# Patient Record
Sex: FEMALE | Race: WHITE | NOT HISPANIC OR LATINO
[De-identification: names, ages, dates, MRNs, and addresses within clinical notes are randomized per-mention and may not be internally consistent; named-entity substitution may affect disease eponyms.]

---

## 2018-08-06 PROBLEM — Z00.00 ENCOUNTER FOR PREVENTIVE HEALTH EXAMINATION: Status: ACTIVE | Noted: 2018-08-06

## 2018-08-07 ENCOUNTER — APPOINTMENT (OUTPATIENT)
Dept: PLASTIC SURGERY | Facility: CLINIC | Age: 28
End: 2018-08-07
Payer: SELF-PAY

## 2019-12-10 ENCOUNTER — APPOINTMENT (OUTPATIENT)
Dept: PLASTIC SURGERY | Facility: CLINIC | Age: 29
End: 2019-12-10
Payer: SELF-PAY

## 2019-12-10 PROCEDURE — 15789 CHEMICAL PEEL FACIAL DERMAL: CPT

## 2020-01-06 ENCOUNTER — APPOINTMENT (OUTPATIENT)
Dept: PLASTIC SURGERY | Facility: CLINIC | Age: 30
End: 2020-01-06
Payer: SELF-PAY

## 2020-01-06 PROCEDURE — 99212 OFFICE O/P EST SF 10 MIN: CPT | Mod: NC

## 2020-04-26 ENCOUNTER — MESSAGE (OUTPATIENT)
Age: 30
End: 2020-04-26

## 2020-05-02 LAB
SARS-COV-2 IGG SERPL IA-ACNC: 0 INDEX
SARS-COV-2 IGG SERPL QL IA: NEGATIVE

## 2020-05-04 ENCOUNTER — APPOINTMENT (OUTPATIENT)
Dept: DISASTER EMERGENCY | Facility: HOSPITAL | Age: 30
End: 2020-05-04

## 2020-08-01 ENCOUNTER — TRANSCRIPTION ENCOUNTER (OUTPATIENT)
Age: 30
End: 2020-08-01

## 2020-09-02 ENCOUNTER — TRANSCRIPTION ENCOUNTER (OUTPATIENT)
Age: 30
End: 2020-09-02

## 2021-03-23 ENCOUNTER — APPOINTMENT (OUTPATIENT)
Dept: PLASTIC SURGERY | Facility: CLINIC | Age: 31
End: 2021-03-23
Payer: SELF-PAY

## 2021-03-23 DIAGNOSIS — L98.8 OTHER SPECIFIED DISORDERS OF THE SKIN AND SUBCUTANEOUS TISSUE: ICD-10-CM

## 2021-03-23 PROCEDURE — 11950 SUBQ NJX FILLING MATRL 1CC/<: CPT

## 2021-03-23 NOTE — PROCEDURE
[FreeTextEntry6] : CRISTOPHER SANDERS is complaining of dynamic rhytids of the face and desires botulinum toxin for temporary reduction in these rhytids.  The risks benefits alternatives limitations and permanent scars were outlined with her . Under aseptic conditions, Botulinum Toxin was administered in the desired area. Please see face sheet for lot , site and dose information. \par \par Please see the scanned face sheet for lot and dose information.\par

## 2021-03-23 NOTE — ASSESSMENT
[FreeTextEntry1] : CRISTOPHER SANDERS  was here for procedural Botox injection.  She  tolerated the procedure well and will return for next dose in 4-5 months.  Instructions were reviewed.\par

## 2022-03-16 ENCOUNTER — NON-APPOINTMENT (OUTPATIENT)
Age: 32
End: 2022-03-16

## 2022-06-01 ENCOUNTER — NON-APPOINTMENT (OUTPATIENT)
Age: 32
End: 2022-06-01

## 2022-10-18 ENCOUNTER — APPOINTMENT (OUTPATIENT)
Dept: PSYCHIATRY | Facility: CLINIC | Age: 32
End: 2022-10-18

## 2022-10-25 ENCOUNTER — APPOINTMENT (OUTPATIENT)
Dept: PSYCHIATRY | Facility: CLINIC | Age: 32
End: 2022-10-25

## 2022-10-27 ENCOUNTER — APPOINTMENT (OUTPATIENT)
Dept: PSYCHIATRY | Facility: CLINIC | Age: 32
End: 2022-10-27

## 2022-10-27 PROCEDURE — 90791 PSYCH DIAGNOSTIC EVALUATION: CPT | Mod: 95

## 2022-11-01 NOTE — PLAN
[Every ___ week(s)] : Psychotherapy: Every [unfilled] week(s) [Family/Marital/Collateral Therapy] : Family/Marital/Collateral Therapy [FreeTextEntry4] : weekly sessions using CBT, skill building, psychoeducation \par \par Problem: varying parenting styles\par Goal: effective communication and exploration/awareness of useful parenting skills

## 2022-11-01 NOTE — HISTORY OF PRESENT ILLNESS
[FreeTextEntry1] : please see social history for more detail. Dyad report strong relationship overall, however impacted by demands of parenthood [FreeTextEntry2] : n/a

## 2022-11-01 NOTE — PSYCHOSOCIAL ASSESSMENT
[None known] : None known [had nightmares about the event(s) or thought about the event(s) when you did not want] : did not have nightmares and/or unwanted thoughts about the events [tried hard not to think about the event(s) or went out of your way to avoid situations that reminded you of the event] : did not need to avoid thinking about events, did not need to avoid situations that might remind patient of events [has been constantly on guard, watchful, or easily startled] : has not been constantly on guard, watchful, or easily startled [felt numb or detached from people, activities, or your surrounding] : has not felt numb or detached from people, activities, or surroundings [felt guilty or unable to stop blaming yourself or others for the event(s) or any problems the event(s) may have caused] : has not felt guilty or unable to stop blaming self or others for event(s), or any problems the event(s) may have caused

## 2022-11-01 NOTE — REASON FOR VISIT
[[facility] Provider/Facility] : [facility] Provider/Facility [Patient] : Patient [Other:___] : [unfilled] [FreeTextEntry2] : increased relationship stress [FreeTextEntry1] : increased stress s/p birth of daughter

## 2022-11-01 NOTE — SOCIAL HISTORY
[FreeTextEntry1] : The above named patient and her  present for intake in order to start couples counseling \par \par Patient’s  is 32 years old, he reports they've been together for over 10 years, he's a  in the city ,he's one of four children, he's very close with his parents who live in [place], patient and  have a 10 month old daughter, they met while in college at the university [employer] \par \par Couple report stress after the birth of their daughter, they admit that they're not seeing “eye to eye” in scenarios within their marriage as well as parenting ,they've had disagreements and have not been effective in resolution \par \par Couple reports a strong relationship prior to baby's birth, they had normal stressors however report that their daughter who they refer to as “spirited” is a poor sleeper, has recently been regressing, is a demanding breast feeder, and often requires a considerable amount of attention from patient \par \par Patient reports she's the oldest in her family, she reports a significant relationship of support from her parents, she is an emergency room RN, she has been an RN for 12 years, 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 She reports her parents required much independence of her while growing up, she reports she was a good student has a strong bond with her siblings, has a large extended family who she remains in regular involvement with. Patient also reports difference sin upbringing is apparent in dyad’s parenting styles. \par \par Patient endorses increased stress as well as feeling overwhelmed in the demands of motherhood ,she also reflects on her 's easy going nature however has increased anxiety after the birth of their daughter .Dyad report that they communicate however  often feels dismissed while patient often feels criticized. They would like to find a better way to communicate and to be more “In Sync”  moving forward within their relationship as well as parenthood. Couple report strong bond, reflect on similar values as well as group of friends, they enjoy traveling and have similar family values \par \par Both parties deny any issues with substance abuse or legal issues, no mental health issues or need for meds.  Patient's  is primary caregiver  for their daughter on the weekends when patient works, he reports he feels calm and organized within this role \par \par Dyad also report increased stress as a result of house projects that they continue to engage in \par \par This couple will benefit from exploration of parenting skills as well as flexibility in mindfulness of varying styles \par \par They will also benefit from reviewing their communication styles and setting time aside for more positive interaction \par \par The next appointment is scheduled with patient’s  for Nov 3rd at 12:00 o'clock to complete his intake process

## 2022-11-03 ENCOUNTER — APPOINTMENT (OUTPATIENT)
Dept: PSYCHIATRY | Facility: CLINIC | Age: 32
End: 2022-11-03

## 2022-11-03 PROCEDURE — 90791 PSYCH DIAGNOSTIC EVALUATION: CPT | Mod: 95

## 2022-11-04 NOTE — PLAN
[FreeTextEntry2] : weekly couples sessions [Family Therapy (all types)] : Family Therapy (all types)  [Psychoeducation] : Psychoeducation  [Skills training (all types)] : Skills training (all types)  [Supportive Therapy] : Supportive Therapy [de-identified] : Patient’s  presents for his own intake session, this intake session will be applied to implementing a treatment plan used during couples session   Patient’s  reports he had an uneventful childhood, grew up in [place] and graduated  high school in 2006, he reports he was very social and actively involved in sports, attending college where he met his wife   Patient’s  reviews and validates couples shared struggles within his marriage (communication styles, parenting styles, conflict resolution)   Patient's  reports that he may “over communicate” with his wife as it was noted his wife at times is dismissive of conversations   Patients  reviews how he feels varying priorities in importance of topics and feels there's been an overall theme and repetition to their conflicts   Patient has also felt shut out on many aspects of decision making within the home, this has increased tensions   He draws on his own skills in being very direct in his conversation, however questions his own delivery as he notes he does not “sugarcoat” any items, he notes his wife may not respond well to his delivery.   Patient’s  acknowledged during last session that they have varying coping skills/styles as well as parenting styles, this seems to be impacting their relationship    The next appointment will be scheduled for the 8th at 11:00 o'clock with a patient to further gather individual intake information. Patient’s  notes no mental health history, sub abuse history within his family, no trauma.  [FreeTextEntry1] : weekly sessions with couple to address communication differences, coping styles, parenting styles  [Every ___ week(s)] : Psychotherapy: Every [unfilled] week(s) [Family/Marital/Collateral Therapy] : Family/Marital/Collateral Therapy [FreeTextEntry4] : weekly sessions using CBT, skill building, psychoeducation \par \par Problem: varying parenting styles\par Goal: effective communication and exploration/awareness of useful parenting skills

## 2022-11-04 NOTE — REASON FOR VISIT
[Collateral without patient] : Collateral without patient [Spouse] : spouse [TextBox_17] : Rahul Alvarez [[facility] Provider/Facility] : [facility] Provider/Facility [Other:___] : [unfilled] [FreeTextEntry2] : increased relationship stress [FreeTextEntry1] : increased stress s/p birth of daughter

## 2022-11-08 ENCOUNTER — APPOINTMENT (OUTPATIENT)
Dept: PSYCHIATRY | Facility: CLINIC | Age: 32
End: 2022-11-08

## 2022-11-08 PROCEDURE — 90791 PSYCH DIAGNOSTIC EVALUATION: CPT | Mod: 95

## 2022-11-08 NOTE — PLAN
[FreeTextEntry2] : weekly couples sessions [Family Therapy (all types)] : Family Therapy (all types)  [Psychoeducation] : Psychoeducation  [Skills training (all types)] : Skills training (all types)  [Supportive Therapy] : Supportive Therapy [de-identified] : Patient’s  presents for his own intake session, this intake session will be applied to implementing a treatment plan used during couples session   Patient’s  reports he had an uneventful childhood, grew up in [place] and graduated  high school in 2006, he reports he was very social and actively involved in sports, attending college where he met his wife   Patient’s  reviews and validates couples shared struggles within his marriage (communication styles, parenting styles, conflict resolution)   Patient's  reports that he may “over communicate” with his wife as it was noted his wife at times is dismissive of conversations   Patients  reviews how he feels varying priorities in importance of topics and feels there's been an overall theme and repetition to their conflicts   Patient has also felt shut out on many aspects of decision making within the home, this has increased tensions   He draws on his own skills in being very direct in his conversation, however questions his own delivery as he notes he does not “sugarcoat” any items, he notes his wife may not respond well to his delivery.   Patient’s  acknowledged during last session that they have varying coping skills/styles as well as parenting styles, this seems to be impacting their relationship    The next appointment will be scheduled for the 8th at 11:00 o'clock with a patient to further gather individual intake information. Patient’s  notes no mental health history, sub abuse history within his family, no trauma.  [FreeTextEntry1] : weekly sessions with couple to address communication differences, coping styles, parenting styles  [Every ___ week(s)] : Psychotherapy: Every [unfilled] week(s) [Family/Marital/Collateral Therapy] : Family/Marital/Collateral Therapy [FreeTextEntry4] : weekly sessions using CBT, skill building, psychoeducation \par \par Problem: varying parenting styles\par Goal: effective communication and exploration/awareness of useful parenting skills

## 2022-11-08 NOTE — PHYSICAL EXAM
[Cooperative] : cooperative [Full] : full [Clear] : clear [Linear/Goal Directed] : linear/goal directed [WNL] : within normal limits [de-identified] : good [FreeTextEntry8] : stable, no concerns

## 2022-11-08 NOTE — REASON FOR VISIT
[Patient] : Patient [[facility] Provider/Facility] : [facility] Provider/Facility [Other:___] : [unfilled] [FreeTextEntry2] : increased relationship stress [FreeTextEntry1] : increased stress s/p birth of daughter

## 2022-11-16 ENCOUNTER — APPOINTMENT (OUTPATIENT)
Dept: PSYCHIATRY | Facility: CLINIC | Age: 32
End: 2022-11-16

## 2022-11-16 PROCEDURE — 90847 FAMILY PSYTX W/PT 50 MIN: CPT | Mod: 95

## 2022-11-19 NOTE — PLAN
[FreeTextEntry2] : weekly couples sessions\par \par Problem: varying parenting styles\par Goal: effective communication and exploration/awareness of useful parenting skills\par \par will use CBT as well as skill building and supporting psychoeducation [Family Therapy (all types)] : Family Therapy (all types)  [Psychoeducation] : Psychoeducation  [Skills training (all types)] : Skills training (all types)  [Supportive Therapy] : Supportive Therapy [de-identified] : Couple present on time for their first couple session, they were alert and oriented, engaged well, were calm and cooperative respectful of each other engaged in effective communication as well as active listening , dyad eager to learn tools to decrease stress  levels within their marriage \par \par Couple reviewed scenarios of stress they've experienced over the past few weeks, this writer helped them to further explore ways in which they may better communicate as well as meet the needs of their partner \par \par Highlighted the theme of patient’s  feeling dismissed while patient feels criticized ,reviewed how this is affecting their interactions with each other as they are also not using effective communication which may help with clarification of needs \par \par Reviewed differences in approach 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 and 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  is candid in reviewing his own level of worry in being a new parent \par \par Much support and time for ventilation provided, encouraged couple to use thorough communication while asking for clarification in exploratory questions when feeling they are disagreeing \par \par No other needs or concerns noted at this time, in light of the Thanksgiving holiday, have arranged to meet with patient and her  again at 12:00 o'clock on the 28th \par \par   [FreeTextEntry1] : weekly sessions with couple to address communication differences, coping styles, parenting styles  [Every ___ week(s)] : Psychotherapy: Every [unfilled] week(s) [Family/Marital/Collateral Therapy] : Family/Marital/Collateral Therapy [FreeTextEntry4] : weekly sessions using CBT, skill building, psychoeducation \par \par

## 2022-11-19 NOTE — REASON FOR VISIT
[Patient] : Patient [[facility] Provider/Facility] : [facility] Provider/Facility [Other:___] : [unfilled] [FreeTextEntry1] : increased stress s/p birth of daughter  [FreeTextEntry2] : increased relationship stress

## 2022-11-19 NOTE — PHYSICAL EXAM
[Cooperative] : cooperative [Full] : full [Clear] : clear [Linear/Goal Directed] : linear/goal directed [WNL] : within normal limits [FreeTextEntry8] : stable, no concerns [de-identified] : good

## 2022-11-28 ENCOUNTER — APPOINTMENT (OUTPATIENT)
Dept: PSYCHIATRY | Facility: CLINIC | Age: 32
End: 2022-11-28

## 2022-11-28 PROCEDURE — 90847 FAMILY PSYTX W/PT 50 MIN: CPT | Mod: 95

## 2022-11-28 NOTE — REASON FOR VISIT
[Patient] : Patient [Spouse] : spouse [TextBox_17] : Rahul Alvarez [[facility] Provider/Facility] : [facility] Provider/Facility [Other:___] : [unfilled] [FreeTextEntry2] : increased relationship stress [FreeTextEntry1] : increased stress s/p birth of daughter

## 2022-11-28 NOTE — PLAN
[FreeTextEntry2] : weekly couples sessions\par \par Problem: varying parenting styles\par Goal: effective communication and exploration/awareness of useful parenting skills\par \par will use CBT as well as skill building and supporting psychoeducation [Family Therapy (all types)] : Family Therapy (all types)  [Psychoeducation] : Psychoeducation  [Skills training (all types)] : Skills training (all types)  [Supportive Therapy] : Supportive Therapy [de-identified] : Patient and her  present for weekly couples session, they were both alert and oriented, engaged well, actively listen to each other, and engaged in appropriate conversation \par \par Content of the session was related to the continued themes noted within their relationship, this having to do with varying coping styles, as well as stress response. This writer provided psychoeducation regarding the importance of acknowledging each other's differences and how they may impact stress levels within their relationship \par \par Continue to encourage to use tools to apply the conflict resolution this including use of exploratory questions as well as effective communication, mindfulness of delivery of information, and each other's needs \par \par Couple continue to show a theme that reflects areas of feeling critiqued versus dismissed \par \par Couple of agreed to continue utilizing tools and to be mindful of scenarios over the past week to present during weekly sessions \par \par Next appt scheduled for the 8th at 11:00 o'clock  [FreeTextEntry1] : weekly sessions with couple to address communication differences, coping styles, parenting styles  [Every ___ week(s)] : Psychotherapy: Every [unfilled] week(s) [Family/Marital/Collateral Therapy] : Family/Marital/Collateral Therapy [FreeTextEntry4] : weekly sessions using CBT, skill building, psychoeducation \par \par

## 2022-12-14 ENCOUNTER — APPOINTMENT (OUTPATIENT)
Dept: PSYCHIATRY | Facility: CLINIC | Age: 32
End: 2022-12-14

## 2022-12-14 PROCEDURE — 90847 FAMILY PSYTX W/PT 50 MIN: CPT | Mod: 95

## 2022-12-14 NOTE — REASON FOR VISIT
[Patient] : Patient [Spouse] : spouse [TextBox_17] : Rahul Alvarez [[facility] Provider/Facility] : [facility] Provider/Facility [Other:___] : [unfilled] [FreeTextEntry2] : increased relationship stress [FreeTextEntry1] : increased stress s/p birth of daughter

## 2022-12-14 NOTE — PLAN
[FreeTextEntry2] : weekly couples sessions\par \par Problem: varying parenting styles\par Goal: effective communication and exploration/awareness of useful parenting skills\par \par will use CBT as well as skill building and supporting psychoeducation [Family Therapy (all types)] : Family Therapy (all types)  [Psychoeducation] : Psychoeducation  [Skills training (all types)] : Skills training (all types)  [Supportive Therapy] : Supportive Therapy [de-identified] : Patient and her  present on time for their weekly couples session. Both participants engaged well, were supportive as well as insightful into their own stress response and tools to utilize to decrease added tensions. Couple engaged in active listening as well as each other’s  review of stressors \par \par Overall report a positive past two weeks, they traveled with 's family over the weekend with good response and seemed to work well as a team \par \par This writer engaged couple in review of the patterns of their tensions, reviewed the importance of understanding the process of their interactions together and how they may be more insightful into each other's varying  stress responses and stress management skills \par \par Encouraged patient and her  to increase their positive interactions together as it seems they are often busy in household tasks , care of their baby and working outside of the home \par \par Education provided regarding the tactic of avoidance and the benefits of engaging in effective communication asking exploratory questions and being mindful of each other's perceptions. \par \par Overall couple have become more insightful into their interactions with each other, they understand the cycle of their relationship and the benefits of  their mindfulness \par \par No other needs or concerns noted at this time, have arranged to meet with the couple again at 11:00 o'clock on the 21st   [FreeTextEntry1] : weekly sessions with couple to address communication differences, coping styles, parenting styles  [Every ___ week(s)] : Psychotherapy: Every [unfilled] week(s) [Family/Marital/Collateral Therapy] : Family/Marital/Collateral Therapy [FreeTextEntry4] : weekly sessions using CBT, skill building, psychoeducation \par \par

## 2022-12-21 ENCOUNTER — APPOINTMENT (OUTPATIENT)
Dept: PSYCHIATRY | Facility: CLINIC | Age: 32
End: 2022-12-21

## 2022-12-21 PROCEDURE — 90847 FAMILY PSYTX W/PT 50 MIN: CPT | Mod: 95

## 2022-12-21 NOTE — REASON FOR VISIT
[Patient] : Patient [Spouse] : spouse [TextBox_17] : Rahul Alvarez [[facility] Provider/Facility] : [facility] Provider/Facility [Other:___] : [unfilled] [FreeTextEntry2] : increased relationship stress [FreeTextEntry1] : increased stress s/p birth of daughter

## 2022-12-21 NOTE — PLAN
[FreeTextEntry2] : weekly couples sessions\par \par Problem: varying parenting styles\par Goal: effective communication and exploration/awareness of useful parenting skills\par \par will use CBT as well as skill building and supporting psychoeducation [Family Therapy (all types)] : Family Therapy (all types)  [Psychoeducation] : Psychoeducation  [Skills training (all types)] : Skills training (all types)  [Supportive Therapy] : Supportive Therapy [de-identified] : The above named patient and her  present for their weekly couple session, they both actively engaged, supported each other, were open to exploring good stress management skills as well as exploration of each other's stresses and needs.  Each participant practiced active listening and validation of each other's feelings \par \par Content of this session continues to be related to differences in coping styles as well as skills and tools used to manage a very busy household. This writer reviewed their differences and the importance of understanding the cycle of their interactions which then result in tension \par \par Much time is also spent reviewing current difficulties with  sleep hygiene. Patient is struggling with sleep for the past year as a result of being a new mom, she has put great emphasis on breastfeeding which seems to have become a significant area of soothing for their daughter. This writer reviewed the importance of good sleep hygiene as well as the benefits of sleep training for their one year old daughter. This writer provided education regarding a sleep schedule for all parties and reviewed how fatigue may decrease positive interactions and effective communication skills \par \par No other needs or concerns noted at this time, have arranged to meet with couple again on the 28th at 11:00 o'clock, couple agreed to approach sleep hygiene concerns in a team approach.  [FreeTextEntry1] : weekly sessions with couple to address communication differences, coping styles, parenting styles  [Every ___ week(s)] : Psychotherapy: Every [unfilled] week(s) [Family/Marital/Collateral Therapy] : Family/Marital/Collateral Therapy [FreeTextEntry4] : weekly sessions using CBT, skill building, psychoeducation \par \par

## 2022-12-28 ENCOUNTER — APPOINTMENT (OUTPATIENT)
Dept: PSYCHIATRY | Facility: CLINIC | Age: 32
End: 2022-12-28
Payer: COMMERCIAL

## 2022-12-28 PROCEDURE — 90847 FAMILY PSYTX W/PT 50 MIN: CPT | Mod: 95

## 2022-12-29 NOTE — PLAN
[FreeTextEntry2] : weekly couples sessions\par \par Problem: varying parenting styles\par Goal: effective communication and exploration/awareness of useful parenting skills\par \par will use CBT as well as skill building and supporting psychoeducation [Family Therapy (all types)] : Family Therapy (all types)  [Psychoeducation] : Psychoeducation  [Skills training (all types)] : Skills training (all types)  [Supportive Therapy] : Supportive Therapy [de-identified] : The above named patient and her  present for their weekly couple session, they were alert and oriented, engaged well ,respectful of each other, practiced active listening and seemed to benefit from support and time for ventilation as well as exploration they have their own stress management skills and tools that may be helpful in utilizing to increase their communication \par \par  Couple reviewed recent advances as well as struggles over the past week, both patient and her  actively engaged, we're reflective, and explored their own stress reactions. Encouraged couple to use terms like "we" and "us" rather than you and I in order to present in a unified way of  addressing stressful situations. Encouraged couple to "slow down" there reactions in order to practice mindfulness. Also encouraged use of clarifying questions and reflection in order to ensure clear understanding and deterring miscommunication \par \par This writer provided education in regard to how a toddler may affect stress levels and how their interactions with each other may be affected \par \par Reviewed the continued importance of effective communication as well as increased positive interactions with each other\par \par No other needs or concerns noted at this time, have arranged to meet with couple again at 10:00 o'clock on the 5th  [FreeTextEntry1] : weekly sessions with couple to address communication differences, coping styles, parenting styles  [Every ___ week(s)] : Psychotherapy: Every [unfilled] week(s) [Family/Marital/Collateral Therapy] : Family/Marital/Collateral Therapy [FreeTextEntry4] : weekly sessions using CBT, skill building, psychoeducation \par \par

## 2022-12-29 NOTE — REASON FOR VISIT
[Patient] : Patient [Spouse] : spouse [TextBox_17] : Rahul Alvarez [[facility] Provider/Facility] : [facility] Provider/Facility [Other:___] : [unfilled] [FreeTextEntry2] : increased relationship stress [FreeTextEntry1] : increased stress s/p birth of daughter

## 2023-01-05 ENCOUNTER — APPOINTMENT (OUTPATIENT)
Dept: PSYCHIATRY | Facility: CLINIC | Age: 33
End: 2023-01-05
Payer: COMMERCIAL

## 2023-01-05 PROCEDURE — 90847 FAMILY PSYTX W/PT 50 MIN: CPT | Mod: 95

## 2023-01-06 NOTE — PLAN
[FreeTextEntry2] : weekly couples sessions\par \par Problem: varying parenting styles\par Goal: effective communication and exploration/awareness of useful parenting skills\par \par will use CBT as well as skill building and supporting psychoeducation [Family Therapy (all types)] : Family Therapy (all types)  [Psychoeducation] : Psychoeducation  [Skills training (all types)] : Skills training (all types)  [Supportive Therapy] : Supportive Therapy [de-identified] : The above named patient and her  present for their weekly couple session, they were alert and oriented engaged well with good eye contact, engaged in active listening and were supportive as well as exploratory in their exchanges with each other \par \par Content of the session was related to sleep deprivation, this writer provided education regarding the level of tension and stress that could be felt between the couple should there be poor sleep hygiene. Patient reports poor sleep since the birth of her daughter ,she often has difficulty falling asleep and then admits to feeling anxious as well as sensitive to sound and light \par \par Couple struggling with feeling lack of teamwork  in parenting, this writer reviewed the importance of setting a schedule and strong compliance in order to deter any additional stress while in tense situations, couple agreed \par \par This writer validated the stress and demands of having a toddler who is very active. Validated tension as well as high level of stress couple is experiencing due to change in roles, change in lifestyle. Reviewed ways to utilize tools  including effective communication and conflict resolution in a team approach may help to join in their varying parenting skills. Also reviewed the importance of self-care and being flexible in prioritizing additional responsibilities \par \par No other needs or concerns noted at this time, couple have agreed that they will make a concerted effort to formulate a schedule together over the next week \par \par next appointment scheduled for the 12th at 10:00 o'clock  [FreeTextEntry1] : weekly sessions with couple to address communication differences, coping styles, parenting styles  [Every ___ week(s)] : Psychotherapy: Every [unfilled] week(s) [Family/Marital/Collateral Therapy] : Family/Marital/Collateral Therapy [FreeTextEntry4] : weekly sessions using CBT, skill building, psychoeducation \par \par

## 2023-01-06 NOTE — REASON FOR VISIT
[Patient] : Patient [Spouse] : spouse [TextBox_17] : Rahul Alvarez [[facility] Provider/Facility] : [facility] Provider/Facility [Other:___] : [unfilled] [FreeTextEntry2] : increased relationship stress [FreeTextEntry1] : increased stress s/p birth of daughter

## 2023-01-12 ENCOUNTER — APPOINTMENT (OUTPATIENT)
Dept: PSYCHIATRY | Facility: CLINIC | Age: 33
End: 2023-01-12
Payer: COMMERCIAL

## 2023-01-12 PROCEDURE — 90846 FAMILY PSYTX W/O PT 50 MIN: CPT | Mod: 95

## 2023-01-12 NOTE — REASON FOR VISIT
[Patient preference] : as per patient preference [Telehealth (audio & video) - Individual/Group] : This visit was provided via telehealth using real-time 2-way audio visual technology. [Medical Office: ([facility])___] : The provider was located at the medical office in [unfilled]. [Home] : The patient, [unfilled], was located at home, [unfilled], at the time of the visit. [Verbal consent obtained from patient/other participant(s)] : Verbal consent for telehealth/telephonic services obtained from patient/other participant(s) [FreeTextEntry4] : 10"02 [FreeTextEntry5] : 10:59 [Patient] : Patient [Spouse] : spouse [TextBox_17] : Rahul Alvarez [[facility] Provider/Facility] : [facility] Provider/Facility [Other:___] : [unfilled] [FreeTextEntry2] : increased relationship stress [FreeTextEntry1] : increased stress s/p birth of daughter

## 2023-01-12 NOTE — PLAN
[FreeTextEntry2] : weekly couples sessions\par \par Problem: varying parenting styles\par Goal: effective communication and exploration/awareness of useful parenting skills\par \par will use CBT as well as skill building and supporting psychoeducation [Family Therapy (all types)] : Family Therapy (all types)  [Psychoeducation] : Psychoeducation  [Skills training (all types)] : Skills training (all types)  [Supportive Therapy] : Supportive Therapy [de-identified] : The above new patient and her  arrived on time for their weekly session ,they were both alert and oriented engaged well, practiced active listening as well as respectful exploration of stressors as well as successes over the past week \par \par Content of the session was related to improved sleep both for patient and her daughter,  patients  validates \par \par This writer continues to review the importance of a team approach in reviewing their level of tension within their home as well as parenting, Reviewed the importance of effective communication as well as engaging in conversation in order to clarify understanding of content being delivered, it appears that this continues to be a theme within their interactions. \par \par Couple continue to struggle with comparing levels of responsibility, this writer reviewed the benefits of exploring responsibility levels in a unified manner, making a list ,and assessing for any changes that may be beneficial for dyad \par \par In addition couple experiencing stress related to patient’s anticipated change in schedule, reviewed how the transition may be difficult for each of them including their daughter, therefore encouraged planning in a preventative manner which may allow couple to feel in control as well as possibly deter any additional stressors \par \par No other needs or concerns noted at this time, have arranged to meet with patient and her  again at 10:00 o'clock on Thursday the 19th  [FreeTextEntry1] : weekly sessions with couple to address communication differences, coping styles, parenting styles  [Every ___ week(s)] : Psychotherapy: Every [unfilled] week(s) [Family/Marital/Collateral Therapy] : Family/Marital/Collateral Therapy [FreeTextEntry4] : weekly sessions using CBT, skill building, psychoeducation \par \par

## 2023-01-20 ENCOUNTER — APPOINTMENT (OUTPATIENT)
Dept: PSYCHIATRY | Facility: CLINIC | Age: 33
End: 2023-01-20
Payer: COMMERCIAL

## 2023-01-20 PROCEDURE — 90847 FAMILY PSYTX W/PT 50 MIN: CPT | Mod: 95

## 2023-01-20 NOTE — PLAN
[FreeTextEntry2] : weekly couples sessions\par \par Problem: varying parenting styles\par Goal: effective communication and exploration/awareness of useful parenting skills\par \par will use CBT as well as skill building and supporting psychoeducation [Family Therapy (all types)] : Family Therapy (all types)  [Psychoeducation] : Psychoeducation  [Skills training (all types)] : Skills training (all types)  [Supportive Therapy] : Supportive Therapy [de-identified] : Patient presents on time with her  for their weekly therapy session, they were both alert and oriented ,engaged well ,actively listened, and engaged in exploratory discussion regarding the cycle of their interactions \par \par It appears that couples’ daughter has had improved sleep, however patient continues to struggle. \par \par Dyad reviewed the past week , outlining conflicts and acknowledging the continuation of the cycle of their interactions. \par \par Encouraged couple to engage in communication with focus put on deterring patient from avoiding a situation when feeling criticized, and reviewed the benefits of patient's  assessing his delivery of information/concerns. \par \par Couple seem to be struggling with feeling a lack of balance and responsibility. Validated each party's stress levels in both being working parents and caring for their daughter who they admit can require a significant amount of their attention \par \par No other needs or concerns noted at this time ,couple have agreed to review their own level of responsibilities as well as their role in the cycle of their conflicts \par \par Have arranged to meet with couple again on the 24th at 4:00 o'clock  [FreeTextEntry1] : weekly sessions with couple to address communication differences, coping styles, parenting styles  [Every ___ week(s)] : Psychotherapy: Every [unfilled] week(s) [Family/Marital/Collateral Therapy] : Family/Marital/Collateral Therapy [FreeTextEntry4] : weekly sessions using CBT, skill building, psychoeducation \par \par

## 2023-01-20 NOTE — REASON FOR VISIT
[Patient preference] : as per patient preference [Telehealth (audio & video) - Individual/Group] : This visit was provided via telehealth using real-time 2-way audio visual technology. [Medical Office: ([facility])___] : The provider was located at the medical office in [unfilled]. [Home] : The patient, [unfilled], was located at home, [unfilled], at the time of the visit. [Verbal consent obtained from patient/other participant(s)] : Verbal consent for telehealth/telephonic services obtained from patient/other participant(s) [FreeTextEntry4] : 11:03 [FreeTextEntry5] : 12:01 [Patient] : Patient [Spouse] : spouse [TextBox_17] : Rahul Alvarez [[facility] Provider/Facility] : [facility] Provider/Facility [Other:___] : [unfilled] [FreeTextEntry2] : increased relationship stress [FreeTextEntry1] : increased stress s/p birth of daughter

## 2023-01-24 ENCOUNTER — APPOINTMENT (OUTPATIENT)
Dept: PSYCHIATRY | Facility: CLINIC | Age: 33
End: 2023-01-24
Payer: COMMERCIAL

## 2023-01-24 PROCEDURE — 90847 FAMILY PSYTX W/PT 50 MIN: CPT | Mod: 95

## 2023-01-24 NOTE — REASON FOR VISIT
[Patient preference] : as per patient preference [Telehealth (audio & video) - Individual/Group] : This visit was provided via telehealth using real-time 2-way audio visual technology. [[place]___] : The provider was located at the medical office in [unfilled]. [Home] : The patient, [unfilled], was located at home, [unfilled], at the time of the visit. [Verbal consent obtained from patient/other participant(s)] : Verbal consent for telehealth/telephonic services obtained from patient/other participant(s) [FreeTextEntry4] : 4pm [FreeTextEntry5] : 4:54 [Patient] : Patient [Spouse] : spouse [TextBox_17] : Rahul Alvarez [[facility] Provider/Facility] : [facility] Provider/Facility [Other:___] : [unfilled] [FreeTextEntry2] : increased relationship stress [FreeTextEntry1] : increased stress s/p birth of daughter

## 2023-01-24 NOTE — PLAN
[FreeTextEntry2] : weekly couples sessions\par \par Problem: varying parenting styles\par Goal: effective communication and exploration/awareness of useful parenting skills\par \par will use CBT as well as skill building and supporting psychoeducation [Family Therapy (all types)] : Family Therapy (all types)  [Psychoeducation] : Psychoeducation  [Skills training (all types)] : Skills training (all types)  [Supportive Therapy] : Supportive Therapy [de-identified] :  Patient and her  arrived on time for their weekly session, they were alert and oriented ,engaged well, engaged in active listening as well as openness in exploring their stress reaction and ways to better communicate as well as resolve areas of tension \par \par  Patient and her  engaged in review of the past week’s stressors. This writer reviewed interaction that occurred during session that was evidence of their cycle. Reviewed the importance of teamwork as well as awareness of their own stress reaction, the importance of being mindful of each other's needs, and not avoiding situations that require to be further explored \par \par Reviewed the importance of positive interactions  as well as some time away from their young daughter, they agreed \par \par Couple were able to reflect on a positive interaction over the past week where they resolved a situation and addressed in a team-like manner. Reviewed the importance of using words like we and us rather than you or I \par \par No other needs or concerns noted at this time, have arranged to meet with patient and her  again on the 2nd at 10:00 o'clock  [FreeTextEntry1] : weekly sessions with couple to address communication differences, coping styles, parenting styles  [Every ___ week(s)] : Psychotherapy: Every [unfilled] week(s) [Family/Marital/Collateral Therapy] : Family/Marital/Collateral Therapy [FreeTextEntry4] : weekly sessions using CBT, skill building, psychoeducation \par \par

## 2023-02-02 ENCOUNTER — APPOINTMENT (OUTPATIENT)
Dept: PSYCHIATRY | Facility: CLINIC | Age: 33
End: 2023-02-02
Payer: COMMERCIAL

## 2023-02-02 PROCEDURE — 90847 FAMILY PSYTX W/PT 50 MIN: CPT | Mod: 95

## 2023-02-02 NOTE — PLAN
[FreeTextEntry2] : weekly couples sessions\par \par Problem: varying parenting styles\par Goal: effective communication and exploration/awareness of useful parenting skills\par \par will use CBT as well as skill building and supporting psychoeducation [Family Therapy (all types)] : Family Therapy (all types)  [Psychoeducation] : Psychoeducation  [Skills training (all types)] : Skills training (all types)  [Supportive Therapy] : Supportive Therapy [de-identified] : The above named patient and her  present for weekly therapy session, they were alert and oriented ,seemed overwhelmed as they both expressed increased stress over the past week, they're able to acknowledge their cycle of interactions and the patterns which they are having difficulty addressing .Both remained active in engagement and practiced active listening. Seemed to benefit from support and time for ventilation \par \par Content of this sessions continues to be review of their cycle, it also appears that fatigue has affected dyad, therefore this writer has encouraged them to place emphasis on sleep training their one year old. Reviewed how fatigue can affect stress tolerance and irritability,  both parties agreed to do so \par \par Reviewed the importance of communication as well as being mindful of each other's needs. Couple struggles with feeling overwhelmed with tasks, times of feeling there is lack of equality in responsibility.  Encouraged couple to explore and sit together to outline further  \par \par No other needs or concerns noted at this time, have arranged to meet with a couple again on the 10th at 11:00 o'clock.  [FreeTextEntry1] : weekly sessions with couple to address communication differences, coping styles, parenting styles  [Every ___ week(s)] : Psychotherapy: Every [unfilled] week(s) [Family/Marital/Collateral Therapy] : Family/Marital/Collateral Therapy [FreeTextEntry4] : weekly sessions using CBT, skill building, psychoeducation \par \par

## 2023-02-02 NOTE — REASON FOR VISIT
[Patient preference] : as per patient preference [Telehealth (audio & video) - Individual/Group] : This visit was provided via telehealth using real-time 2-way audio visual technology. [Medical Office: ([facility])___] : The provider was located at the medical office in [unfilled]. [Home] : The patient, [unfilled], was located at home, [unfilled], at the time of the visit. [Verbal consent obtained from patient/other participant(s)] : Verbal consent for telehealth/telephonic services obtained from patient/other participant(s) [FreeTextEntry4] : 10:01 [FreeTextEntry5] : 11:00 [Patient] : Patient [Spouse] : spouse [TextBox_17] : Rahul Alvarez [[facility] Provider/Facility] : [facility] Provider/Facility [Other:___] : [unfilled] [FreeTextEntry2] : increased relationship stress [FreeTextEntry1] : increased stress s/p birth of daughter

## 2023-02-17 ENCOUNTER — APPOINTMENT (OUTPATIENT)
Dept: PSYCHIATRY | Facility: CLINIC | Age: 33
End: 2023-02-17

## 2023-02-24 ENCOUNTER — APPOINTMENT (OUTPATIENT)
Dept: PSYCHIATRY | Facility: CLINIC | Age: 33
End: 2023-02-24
Payer: COMMERCIAL

## 2023-02-24 PROCEDURE — 90846 FAMILY PSYTX W/O PT 50 MIN: CPT | Mod: 95

## 2023-02-24 NOTE — PLAN
[FreeTextEntry2] : weekly couples sessions\par \par Problem: varying parenting styles\par Goal: effective communication and exploration/awareness of useful parenting skills\par \par will use CBT as well as skill building and supporting psychoeducation [Emotionally Focused Couples Therapy] : Emotionally Focused Couples Therapy [Psychoeducation] : Psychoeducation  [Skills training (all types)] : Skills training (all types)  [Supportive Therapy] : Supportive Therapy [de-identified] : The above named patient and her  present on time for their weekly session ,they're alert and oriented, they engage well ,actively listen to each other's concerns and provide feedback to each other in a respectful manner.. Dyad benefit from support and time for ventilation as well as review of their cycle within their marriage which has created increased tension \par \par couple spends time reviewing the past few weeks, scenarios that have occurred, and their own awareness of their cycle. This writer continues to encourage increased communication as they admit they often assume each other's thoughts and plans. This writer continues to review the importance of spending positive time together outside of the home. It appears that each participant has different perspectives on time together away from the home .This writer spent time reviewing their own parents’ interpersonal relationships and how it may have factored into current interactions with each other \par \par Continue to review the cycle within their relationship and also reviewed the importance of good self-care which includes spending time outlining tasks as this often creates added tension and feelings of resentment. \par \par No other needs or concerns noted at this time, dyad will meet with this writer again on the 1st at 9:00 o'clock  [FreeTextEntry1] : weekly sessions with couple to address communication differences, coping styles, parenting styles  [Every ___ week(s)] : Psychotherapy: Every [unfilled] week(s) [Family/Marital/Collateral Therapy] : Family/Marital/Collateral Therapy [FreeTextEntry4] : weekly sessions using CBT, skill building, psychoeducation \par \par

## 2023-02-24 NOTE — REASON FOR VISIT
[Patient preference] : as per patient preference [Telehealth (audio & video) - Individual/Group] : This visit was provided via telehealth using real-time 2-way audio visual technology. [Other Location: e.g. Home (Enter Location, City,State)___] : The provider was located at [unfilled]. [Home] : The patient, [unfilled], was located at home, [unfilled], at the time of the visit. [Verbal consent obtained from patient/other participant(s)] : Verbal consent for telehealth/telephonic services obtained from patient/other participant(s) [FreeTextEntry4] : 9:00 [FreeTextEntry5] : 10:00 [Patient] : Patient [Spouse] : spouse [TextBox_17] : Rahul Alvarez [[facility] Provider/Facility] : [facility] Provider/Facility [Other:___] : [unfilled] [FreeTextEntry2] : increased relationship stress [FreeTextEntry1] : increased stress s/p birth of daughter

## 2023-03-01 ENCOUNTER — APPOINTMENT (OUTPATIENT)
Dept: PSYCHIATRY | Facility: CLINIC | Age: 33
End: 2023-03-01

## 2023-03-09 ENCOUNTER — APPOINTMENT (OUTPATIENT)
Dept: PSYCHIATRY | Facility: CLINIC | Age: 33
End: 2023-03-09
Payer: COMMERCIAL

## 2023-03-09 PROCEDURE — 90847 FAMILY PSYTX W/PT 50 MIN: CPT | Mod: 95

## 2023-03-09 NOTE — PLAN
[FreeTextEntry2] : weekly couples sessions\par \par Problem: varying parenting styles\par Goal: effective communication and exploration/awareness of useful parenting skills\par \par will use CBT as well as skill building and supporting psychoeducation [Emotionally Focused Couples Therapy] : Emotionally Focused Couples Therapy [Psychoeducation] : Psychoeducation  [Skills training (all types)] : Skills training (all types)  [Supportive Therapy] : Supportive Therapy [de-identified] : The above named patient and her  present for weekly session ,they're alert and oriented, engaged well ,practice active listening, and engage well with each other, they're aware of their cycle of their interactions and provide support as well as clarification to each other during this session \par \par Content of the session was continued review of couples’ cycle, patient's  feeling dismissed and patient feeling judged . They share scenarios over the past two weeks where this cycle has continued however, they show increased awareness. This writer also reviewed the importance of effective communication and planning. Provide education regarding varying parenting styles and the benefits of being mindful of each other's needs and differences \par \par No other needs or concerns noted at this time, have arranged to meet with a couple again on the 16th at 11:00 o'clock  [FreeTextEntry1] : weekly sessions with couple to address communication differences, coping styles, parenting styles  [Every ___ week(s)] : Psychotherapy: Every [unfilled] week(s) [Family/Marital/Collateral Therapy] : Family/Marital/Collateral Therapy [FreeTextEntry4] : weekly sessions using CBT, skill building, psychoeducation , EFT\par \par

## 2023-03-09 NOTE — REASON FOR VISIT
[Patient preference] : as per patient preference [Telehealth (audio & video) - Individual/Group] : This visit was provided via telehealth using real-time 2-way audio visual technology. [Other Location: e.g. Home (Enter Location, City,State)___] : The provider was located at [unfilled]. [Home] : The patient, [unfilled], was located at home, [unfilled], at the time of the visit. [Verbal consent obtained from patient/other participant(s)] : Verbal consent for telehealth/telephonic services obtained from patient/other participant(s) [FreeTextEntry4] : 11 [FreeTextEntry5] : 11:55 [Patient] : Patient [Spouse] : spouse [TextBox_17] : Rahul Alvarez [[facility] Provider/Facility] : [facility] Provider/Facility [Other:___] : [unfilled] [FreeTextEntry2] : increased relationship stress [FreeTextEntry1] : increased stress s/p birth of daughter

## 2023-03-16 ENCOUNTER — APPOINTMENT (OUTPATIENT)
Dept: PSYCHIATRY | Facility: CLINIC | Age: 33
End: 2023-03-16
Payer: COMMERCIAL

## 2023-03-16 PROCEDURE — 90847 FAMILY PSYTX W/PT 50 MIN: CPT | Mod: 95

## 2023-03-16 NOTE — PLAN
[FreeTextEntry2] : weekly couples sessions\par \par Problem: varying parenting styles\par Goal: effective communication and exploration/awareness of useful parenting skills\par \par will use CBT as well as skill building and supporting psychoeducation [Emotionally Focused Couples Therapy] : Emotionally Focused Couples Therapy [Psychoeducation] : Psychoeducation  [Skills training (all types)] : Skills training (all types)  [Supportive Therapy] : Supportive Therapy [de-identified] :  The above named patient and her  present for their weekly session, they were both alert and oriented, engaged well, practiced active listening as well as complimenting each other with insight and awareness of their cycle They acknowledge more positive interactions during the past week \par \par Couple spend time reviewing how some of their responses during the past week represented their cycle but how they understand the need to improve communication and spend more alone time together \par \par overall, this week couple seem to be planning  ways they engage with each other, each understanding how their stress response impacts each other \par \par Reviewed the importance of alone time which they have planned over the weekend, couple continue to struggle with their sleeping arrangements, this writer reviewed the importance of adhering to a strong sleep schedule , patient's  agreed to further address some medical issues that may impact how his wife sleeps when she's in the bedroom with him \par \par Validated progress and encouraged couple to remain focused on good communication skills and positive time together ,next week's schedule uncertain therefore couple remain in contact with this writer to plan  [FreeTextEntry1] : weekly sessions with couple to address communication differences, coping styles, parenting styles  [Every ___ week(s)] : Psychotherapy: Every [unfilled] week(s) [Family/Marital/Collateral Therapy] : Family/Marital/Collateral Therapy [FreeTextEntry4] : weekly sessions using CBT, skill building, psychoeducation , EFT\par \par

## 2023-03-16 NOTE — REASON FOR VISIT
[Patient preference] : as per patient preference [Telehealth (audio & video) - Individual/Group] : This visit was provided via telehealth using real-time 2-way audio visual technology. [Other Location: e.g. Home (Enter Location, City,State)___] : The provider was located at [unfilled]. [Home] : The patient, [unfilled], was located at home, [unfilled], at the time of the visit. [Verbal consent obtained from patient/other participant(s)] : Verbal consent for telehealth/telephonic services obtained from patient/other participant(s) [FreeTextEntry4] : 11 [FreeTextEntry5] : 11:55 [Patient] : Patient [Spouse] : spouse [TextBox_17] : Rahul Alvarez [[facility] Provider/Facility] : [facility] Provider/Facility [Other:___] : [unfilled] [FreeTextEntry2] : increased relationship stress [FreeTextEntry1] : increased stress s/p birth of daughter

## 2023-04-14 ENCOUNTER — APPOINTMENT (OUTPATIENT)
Dept: PSYCHIATRY | Facility: CLINIC | Age: 33
End: 2023-04-14
Payer: COMMERCIAL

## 2023-04-14 PROCEDURE — 90847 FAMILY PSYTX W/PT 50 MIN: CPT | Mod: 95

## 2023-04-14 NOTE — PLAN
[FreeTextEntry2] : weekly couples sessions\par \par Problem: varying parenting styles\par Goal: effective communication and exploration/awareness of useful parenting skills\par \par will use CBT as well as skill building and supporting psychoeducation [Emotionally Focused Couples Therapy] : Emotionally Focused Couples Therapy [Psychoeducation] : Psychoeducation  [Skills training (all types)] : Skills training (all types)  [Supportive Therapy] : Supportive Therapy [de-identified] : The above named patient and her  present for their weekly session, they're alert and oriented engaged well, engage in active listening and are supportive of each other. They reflect on overall awareness and noted improvement seen over the past few weeks in their interactions, noting their cycle and mindfulness of tools to utilize \par \par Patient and  review the past weeks noting more of a connected approach in addressing areas of tension. Patient feels that her sleep has improved, however their daughter’s sleep schedule has recently regressed. This writer reviewed the importance of addressing any regression as sleep has been a significant struggle, they both agree Patient continues to sleep in a room separate from her  ,she reports this having to do with her own restlessness and sleep sensitivities. support and education provided regarding good sleep hygiene as well as parenting tools \par \par Patient discloses that couple have recently found out she's pregnant, she feels disconnected from her  as she expresses much upset in not being able to attend her brother's wedding in November when she is likely due the same week. Patient is appropriately tearful ,also reflecting on this pregnancy being unplanned. Much support provided, validated  her emotional response, her  supported her as well and was able to reflect on his own emotional response noting the impact on his wife.  \par \par Patient will have an ultrasound on this day ,she has requested to attend appointment independent of her  . This writer reviewed the importance of good support during this time as she's experiencing a strong emotional reaction to the impact of pregnancy and how it will affect her life. This writer reflected on patient’s historic style of avoidance and isolation when feeling overwhelmed, patient aware \par \par No other needs or concerns noted at this time, have arranged to meet with patient and her  again at 3:00 o'clock on the 19th  [FreeTextEntry1] : weekly sessions with couple to address communication differences, coping styles, parenting styles  [Every ___ week(s)] : Psychotherapy: Every [unfilled] week(s) [Family/Marital/Collateral Therapy] : Family/Marital/Collateral Therapy [FreeTextEntry4] : weekly sessions using CBT, skill building, psychoeducation , EFT\par \par

## 2023-04-19 ENCOUNTER — APPOINTMENT (OUTPATIENT)
Dept: PSYCHIATRY | Facility: CLINIC | Age: 33
End: 2023-04-19
Payer: COMMERCIAL

## 2023-04-19 PROCEDURE — 90847 FAMILY PSYTX W/PT 50 MIN: CPT | Mod: 95

## 2023-04-19 NOTE — REASON FOR VISIT
[Patient preference] : as per patient preference [Telehealth (audio & video) - Individual/Group] : This visit was provided via telehealth using real-time 2-way audio visual technology. [Medical Office: ([facility])___] : The provider was located at the medical office in [unfilled]. [Home] : The patient, [unfilled], was located at home, [unfilled], at the time of the visit. [Verbal consent obtained from patient/other participant(s)] : Verbal consent for telehealth/telephonic services obtained from patient/other participant(s) [FreeTextEntry4] : 3 [FreeTextEntry5] : 3:55 [Patient] : Patient [Spouse] : spouse [TextBox_17] : Rahul Alvarez [[facility] Provider/Facility] : [facility] Provider/Facility [Other:___] : [unfilled] [FreeTextEntry2] : increased relationship stress [FreeTextEntry1] : increased stress s/p birth of daughter

## 2023-04-19 NOTE — PLAN
[FreeTextEntry2] : weekly couples sessions\par \par Problem: varying parenting styles\par Goal: effective communication and exploration/awareness of useful parenting skills\par \par will use CBT as well as skill building and supporting psychoeducation [Emotionally Focused Couples Therapy] : Emotionally Focused Couples Therapy [Psychoeducation] : Psychoeducation  [Skills training (all types)] : Skills training (all types)  [Supportive Therapy] : Supportive Therapy [de-identified] :  Patient and her  present on time for their weekly session, they're alert and oriented, engage well,  good eye contact, engage in active listening ,respectful to each other ,seem to benefit from support and time for ventilation \par \par Additional time spent during the session reviewing patient’s pregnancy and how it has impacted her emotional health. Dyad seem to be less emotionally impacted, looking at the scenario in a way that is positive end in terms of their own level of control \par \par Couple continued to review scenarios which show evidence of their cycle, patient's  aware of his need to further address the way he delivers information. Engaged couple in review how they interact with each other and the importance of increased communication, they both agree \par \par Patient continues to sleep in the guest bedroom, this writer provided support as well as education regarding the benefits of being connected at the end of the evening, they both agree that this would be beneficial to their overall connectedness \par \par No other needs or concerns noted at this time, have arranged to meet with patient and her  again at 11:00 o'clock on the 28th  [FreeTextEntry1] : weekly sessions with couple to address communication differences, coping styles, parenting styles  [Every ___ week(s)] : Psychotherapy: Every [unfilled] week(s) [Family/Marital/Collateral Therapy] : Family/Marital/Collateral Therapy [FreeTextEntry4] : weekly sessions using CBT, skill building, psychoeducation , EFT\par \par

## 2023-04-28 ENCOUNTER — APPOINTMENT (OUTPATIENT)
Dept: PSYCHIATRY | Facility: CLINIC | Age: 33
End: 2023-04-28
Payer: COMMERCIAL

## 2023-04-28 PROCEDURE — 90847 FAMILY PSYTX W/PT 50 MIN: CPT | Mod: 95

## 2023-04-29 NOTE — REASON FOR VISIT
[Patient preference] : as per patient preference [Telehealth (audio & video) - Individual/Group] : This visit was provided via telehealth using real-time 2-way audio visual technology. [Other Location: e.g. Home (Enter Location, City,State)___] : The provider was located at [unfilled]. [Home] : The patient, [unfilled], was located at home, [unfilled], at the time of the visit. [Verbal consent obtained from patient/other participant(s)] : Verbal consent for telehealth/telephonic services obtained from patient/other participant(s) [Patient] : Patient [Spouse] : spouse [[facility] Provider/Facility] : [facility] Provider/Facility [Other:___] : [unfilled] [FreeTextEntry4] : 11 [FreeTextEntry5] : 11:55 [TextBox_17] : Rahul Alvarez [FreeTextEntry2] : increased relationship stress [FreeTextEntry1] : increased stress s/p birth of daughter

## 2023-04-29 NOTE — PLAN
[Psychoeducation] : Psychoeducation  [Emotionally Focused Couples Therapy] : Emotionally Focused Couples Therapy [Skills training (all types)] : Skills training (all types)  [Supportive Therapy] : Supportive Therapy [Every ___ week(s)] : Psychotherapy: Every [unfilled] week(s) [Family/Marital/Collateral Therapy] : Family/Marital/Collateral Therapy [FreeTextEntry2] : weekly couples sessions\par \par Problem: varying parenting styles\par Goal: effective communication and exploration/awareness of useful parenting skills\par \par will use CBT as well as skill building and supporting psychoeducation [de-identified] :  Patient and her  present for their weekly couple session. They were alert and oriented, in good spirits. Engaged well, actively listened to each other and seem to be improving their awareness of their cycle. \par \par Patient and her  spent time reviewing the content of the past week , they  are able to identify areas of their cycle. This writer also reviewed and validated parental fatigue that it appears patient experiences and explored how this may impact her communication skills and Parenting ,patient agrees. Reviewed the importance of self-care. Her  endorsed the ability for her to take advantage of time for herself and his willingness to support these efforts. \par \par Encouraged continued use of effective communication as well as clarifying questions in order to deter miscommunication and misperceptions. \par \par No other needs or concerns noted at this time. Will meet with patient and her  again once they confirm availably  [FreeTextEntry1] : weekly sessions with couple to address communication differences, coping styles, parenting styles  [FreeTextEntry4] : weekly sessions using CBT, skill building, psychoeducation , EFT\par \par

## 2023-05-22 ENCOUNTER — APPOINTMENT (OUTPATIENT)
Dept: PSYCHIATRY | Facility: CLINIC | Age: 33
End: 2023-05-22
Payer: COMMERCIAL

## 2023-05-22 PROCEDURE — 90847 FAMILY PSYTX W/PT 50 MIN: CPT | Mod: 95

## 2023-05-22 NOTE — REASON FOR VISIT
[Patient preference] : as per patient preference [Telehealth (audio & video) - Individual/Group] : This visit was provided via telehealth using real-time 2-way audio visual technology. [Other Location: e.g. Home (Enter Location, City,State)___] : The provider was located at [unfilled]. [Home] : The patient, [unfilled], was located at home, [unfilled], at the time of the visit. [Verbal consent obtained from patient/other participant(s)] : Verbal consent for telehealth/telephonic services obtained from patient/other participant(s) [FreeTextEntry4] : 11 [FreeTextEntry5] : 11:55 [Patient] : Patient [Spouse] : spouse [TextBox_17] : Rahul Alvarez [[facility] Provider/Facility] : [facility] Provider/Facility [Other:___] : [unfilled] [FreeTextEntry2] : increased relationship stress [FreeTextEntry1] : increased stress s/p birth of daughter

## 2023-05-22 NOTE — PLAN
[FreeTextEntry2] : weekly couples sessions\par \par Problem: varying parenting styles\par Goal: effective communication and exploration/awareness of useful parenting skills\par \par will use CBT as well as skill building and supporting psychoeducation [Emotionally Focused Couples Therapy] : Emotionally Focused Couples Therapy [Psychoeducation] : Psychoeducation  [Skills training (all types)] : Skills training (all types)  [Supportive Therapy] : Supportive Therapy [de-identified] : The above named patient and her  present for their weekly session, alert and oriented, engage well. Are respectful to each other, make good eye contact and always seem to benefit from support and review of the past week stressors as well as review of their cycles and tools to utilize going forward. \par \par Patient review the use of a code word which has been helpful in deterring. added tensions. They've used a team approach to parenting. Patients  reports he's been mindful of his own need to be flexible in his perception and expectations he recognizes his wife's increased engagement and expresses appreciation .Patient also recognizes her 's flexibility and reflects on feeling less judged. \par \par Reviewed parenting styles which differ within dyads relationship and parenting. Reviewed the benefits of setting structure for their daughter as well as modeling good coping techniques, especially considering birth of their baby in November both participants agree. \par \par No other needs or concerns noted at this time, will meet with couple again at 11:00 o'clock on the 1st  [FreeTextEntry1] : weekly sessions with couple to address communication differences, coping styles, parenting styles  [Every ___ week(s)] : Psychotherapy: Every [unfilled] week(s) [Family/Marital/Collateral Therapy] : Family/Marital/Collateral Therapy [FreeTextEntry4] : weekly sessions using CBT, skill building, psychoeducation , EFT\par \par

## 2023-06-01 ENCOUNTER — APPOINTMENT (OUTPATIENT)
Dept: PSYCHIATRY | Facility: CLINIC | Age: 33
End: 2023-06-01
Payer: COMMERCIAL

## 2023-06-01 PROCEDURE — 90847 FAMILY PSYTX W/PT 50 MIN: CPT | Mod: 95

## 2023-06-01 NOTE — PLAN
[FreeTextEntry2] : weekly couples sessions\par \par Problem: varying parenting styles\par Goal: effective communication and exploration/awareness of useful parenting skills\par \par will use CBT as well as skill building and supporting psychoeducation [Emotionally Focused Couples Therapy] : Emotionally Focused Couples Therapy [Psychoeducation] : Psychoeducation  [Skills training (all types)] : Skills training (all types)  [Supportive Therapy] : Supportive Therapy [de-identified] : The above named patient and her  present for their weekly session. They're alert and oriented, engaged well. Seemed to benefit from support and time for ventilation as well as review of the cycle of their relationship. \par \par Couple spend time reviewing the past weekend ,areas of cycles within their relationship, including control and misunderstanding during lack during communication, this writer provided education regarding differences in attachment as well as parenting styles. \par \par Engaged couple in examination of their own stress response in regard to relationship and parenting stressors. Each participant engaged in review of their own stress response as well as goals for the next week. Agreed to meet with couple again on the 9th at 1:00 o'clock. However, considering high stress scenarios, we viewed this writers availability as needed.  [FreeTextEntry1] : weekly sessions with couple to address communication differences, coping styles, parenting styles  [Every ___ week(s)] : Psychotherapy: Every [unfilled] week(s) [Family/Marital/Collateral Therapy] : Family/Marital/Collateral Therapy [FreeTextEntry4] : weekly sessions using CBT, skill building, psychoeducation , EFT\par \par

## 2023-06-01 NOTE — REASON FOR VISIT
[Patient preference] : as per patient preference [Telehealth (audio & video) - Individual/Group] : This visit was provided via telehealth using real-time 2-way audio visual technology. [Other Location: e.g. Home (Enter Location, City,State)___] : The provider was located at [unfilled]. [Home] : The patient, [unfilled], was located at home, [unfilled], at the time of the visit. [Verbal consent obtained from patient/other participant(s)] : Verbal consent for telehealth/telephonic services obtained from patient/other participant(s) [FreeTextEntry4] : 11 [FreeTextEntry5] : 11:55 [Patient] : Patient [Spouse] : spouse [TextBox_17] : Rahul Alvarez [[facility] Provider/Facility] : [facility] Provider/Facility [Other:___] : [unfilled] [FreeTextEntry2] : increased relationship stress [FreeTextEntry1] : increased stress s/p birth of daughter

## 2023-06-08 ENCOUNTER — APPOINTMENT (OUTPATIENT)
Dept: PSYCHIATRY | Facility: CLINIC | Age: 33
End: 2023-06-08
Payer: COMMERCIAL

## 2023-06-08 PROCEDURE — 90847 FAMILY PSYTX W/PT 50 MIN: CPT | Mod: 95

## 2023-06-08 NOTE — REASON FOR VISIT
[Patient preference] : as per patient preference [Telehealth (audio & video) - Individual/Group] : This visit was provided via telehealth using real-time 2-way audio visual technology. [Other Location: e.g. Home (Enter Location, City,State)___] : The provider was located at [unfilled]. [Home] : The patient, [unfilled], was located at home, [unfilled], at the time of the visit. [Verbal consent obtained from patient/other participant(s)] : Verbal consent for telehealth/telephonic services obtained from patient/other participant(s) [FreeTextEntry4] : 3 [FreeTextEntry5] : 3:55 [Patient] : Patient [Spouse] : spouse [TextBox_17] : Rahul Alvarez [[facility] Provider/Facility] : [facility] Provider/Facility [Other:___] : [unfilled] [FreeTextEntry2] : increased relationship stress [FreeTextEntry1] : increased stress s/p birth of daughter

## 2023-06-08 NOTE — PLAN
[FreeTextEntry2] : weekly couples sessions\par \par Problem: varying parenting styles\par Goal: effective communication and exploration/awareness of useful parenting skills\par \par will use CBT as well as skill building and supporting psychoeducation [Emotionally Focused Couples Therapy] : Emotionally Focused Couples Therapy [Psychoeducation] : Psychoeducation  [Skills training (all types)] : Skills training (all types)  [Supportive Therapy] : Supportive Therapy [de-identified] : The above named patient presents on time for her weekly couples session with her . They were alert and oriented, engaged well , actively  listened to each other , seem to benefit from support and processing of past week events and stressors as well as review of the cycles. Couple continue to struggle in lack of communication as well as varying coping styles and stress response. Although they are aware of their cycle, they will benefit from continued weekly support. No other needs or concerns noted at this time. Patient is managing her pregnancy well. Although she can't continues to struggle with sleep, which is a significant stressor between her and her  , this will be further explored in future session. Next appointment will be scheduled once dyad confirm their availability.  [FreeTextEntry1] : weekly sessions with couple to address communication differences, coping styles, parenting styles  [Every ___ week(s)] : Psychotherapy: Every [unfilled] week(s) [Family/Marital/Collateral Therapy] : Family/Marital/Collateral Therapy [FreeTextEntry4] : weekly sessions using CBT, skill building, psychoeducation , EFT\par \par

## 2023-07-20 ENCOUNTER — APPOINTMENT (OUTPATIENT)
Dept: PSYCHIATRY | Facility: CLINIC | Age: 33
End: 2023-07-20
Payer: COMMERCIAL

## 2023-07-20 PROCEDURE — 90847 FAMILY PSYTX W/PT 50 MIN: CPT | Mod: 95

## 2023-07-20 NOTE — REASON FOR VISIT
[Patient preference] : as per patient preference [Telehealth (audio & video) - Individual/Group] : This visit was provided via telehealth using real-time 2-way audio visual technology. [Medical Office: ([facility])___] : The provider was located at the medical office in [unfilled]. [Home] : The patient, [unfilled], was located at home, [unfilled], at the time of the visit. [Verbal consent obtained from patient/other participant(s)] : Verbal consent for telehealth/telephonic services obtained from patient/other participant(s) [FreeTextEntry4] : 1 [FreeTextEntry5] : 1:55 [Patient] : Patient [Spouse] : spouse [TextBox_17] : Rahul Alvarez [[facility] Provider/Facility] : [facility] Provider/Facility [Other:___] : [unfilled] [FreeTextEntry2] : increased relationship stress [FreeTextEntry1] : increased stress s/p birth of daughter

## 2023-07-20 NOTE — PLAN
[FreeTextEntry2] : weekly couples sessions\par \par Problem: varying parenting styles\par Goal: effective communication and exploration/awareness of useful parenting skills\par \par will use CBT as well as skill building and supporting psychoeducation [Emotionally Focused Couples Therapy] : Emotionally Focused Couples Therapy [Psychoeducation] : Psychoeducation  [Skills training (all types)] : Skills training (all types)  [Supportive Therapy] : Supportive Therapy [de-identified] : The above named patient and her  present for weekly session. They're alert and oriented, Engage well, seem to benefit from support and time for ventilation as well as review of recent stressors. Couple continue to struggle with lack of communication and misunderstanding each other's reactions. This writer provided education regarding how different perceptions can impact tensions when not fully understood. Situation was further explored as couple had varying perceptions of the interaction. Patient's  reviews the desire to have increased positive interactions while patient is struggling with stress response related to her ’s reaction to her parenting. Reviewed how this difference in need and perceptions can create barriers in moving forward and forming connections. Encouraged couple to seek clarifying questions from each other in order to deter miscommunication and misunderstandings. Also reviewed the importance of experiencing increase in positive interactions. Couple looking forward to a vacation over the next week, they will contact this writer to schedule next appointment during the week of the 31st  [FreeTextEntry1] : weekly sessions with couple to address communication differences, coping styles, parenting styles  [Every ___ week(s)] : Psychotherapy: Every [unfilled] week(s) [Family/Marital/Collateral Therapy] : Family/Marital/Collateral Therapy [FreeTextEntry4] : weekly sessions using CBT, skill building, psychoeducation , EFT\par \par

## 2023-08-25 ENCOUNTER — APPOINTMENT (OUTPATIENT)
Dept: PSYCHIATRY | Facility: CLINIC | Age: 33
End: 2023-08-25
Payer: COMMERCIAL

## 2023-08-25 PROCEDURE — 90847 FAMILY PSYTX W/PT 50 MIN: CPT | Mod: 95

## 2023-08-25 NOTE — PLAN
[FreeTextEntry2] : weekly couples sessions\par  \par  Problem: varying parenting styles\par  Goal: effective communication and exploration/awareness of useful parenting skills\par  \par  will use CBT as well as skill building and supporting psychoeducation [Emotionally Focused Couples Therapy] : Emotionally Focused Couples Therapy [Psychoeducation] : Psychoeducation  [Skills training (all types)] : Skills training (all types)  [Supportive Therapy] : Supportive Therapy [de-identified] :  The above named patient and her  present for their couple session. They were alert and oriented, engaged well, practice active listening ,were able to show awareness of the process of their themes within their relationship, often showing differences in their own perceptions as well as agendas related to tasks. Patient's  paying closer attention to his delivery of information which many times causes his wife to avoid situations and communication ,and retracts from engagement. Theme was also highlighted in the way in which couple process conflict as patient holds on to the emotional effects while her  is able to move forward even without resolution. This writer continues to review the importance of effective communication as it continues to increase conflict and sets barriers. Time we spent reviewing topic of patient's maternity leave and return to work . Couple were able to engage appropriately, expressing each other's expectations as well as acknowledging with respect varying perceptions. Encouraged communication to continue after this session, with next session scheduled for the 5th at 12:00.  [FreeTextEntry1] : weekly sessions with couple to address communication differences, coping styles, parenting styles  [Every ___ week(s)] : Psychotherapy: Every [unfilled] week(s) [Family/Marital/Collateral Therapy] : Family/Marital/Collateral Therapy [FreeTextEntry4] : weekly sessions using CBT, skill building, psychoeducation , EFT\par  \par

## 2023-08-25 NOTE — REASON FOR VISIT
[Patient preference] : as per patient preference [Telehealth (audio & video) - Individual/Group] : This visit was provided via telehealth using real-time 2-way audio visual technology. [Other Location: e.g. Home (Enter Location, City,State)___] : The provider was located at [unfilled]. [Home] : The patient, [unfilled], was located at home, [unfilled], at the time of the visit. [Verbal consent obtained from patient/other participant(s)] : Verbal consent for telehealth/telephonic services obtained from patient/other participant(s) [FreeTextEntry4] : 11 [FreeTextEntry5] : 11:58 [Patient] : Patient [Spouse] : spouse [TextBox_17] : Rahul Alvarez [[facility] Provider/Facility] : [facility] Provider/Facility [Other:___] : [unfilled] [FreeTextEntry2] : increased relationship stress [FreeTextEntry1] : increased stress s/p birth of daughter

## 2023-09-05 ENCOUNTER — APPOINTMENT (OUTPATIENT)
Dept: PSYCHIATRY | Facility: CLINIC | Age: 33
End: 2023-09-05
Payer: COMMERCIAL

## 2023-09-05 DIAGNOSIS — Z63.0 PROBLEMS IN RELATIONSHIP WITH SPOUSE OR PARTNER: ICD-10-CM

## 2023-09-05 DIAGNOSIS — F43.0 ACUTE STRESS REACTION: ICD-10-CM

## 2023-09-05 PROCEDURE — 90847 FAMILY PSYTX W/PT 50 MIN: CPT | Mod: 95

## 2023-09-05 SDOH — SOCIAL STABILITY - SOCIAL INSECURITY: PROBLEMS IN RELATIONSHIP WITH SPOUSE OR PARTNER: Z63.0

## 2023-09-05 NOTE — REASON FOR VISIT
[Patient preference] : as per patient preference [Telehealth (audio & video) - Individual/Group] : This visit was provided via telehealth using real-time 2-way audio visual technology. [Home] : The patient, [unfilled], was located at home, [unfilled], at the time of the visit. [Verbal consent obtained from patient/other participant(s)] : Verbal consent for telehealth/telephonic services obtained from patient/other participant(s) [FreeTextEntry4] : 12 [FreeTextEntry5] : 12:59 [Patient] : Patient [Spouse] : spouse [TextBox_17] : Rahul Alvarez [[facility] Provider/Facility] : [facility] Provider/Facility [Other:___] : [unfilled] [FreeTextEntry2] : increased relationship stress [FreeTextEntry1] : increased stress s/p birth of daughter

## 2023-09-05 NOTE — PLAN
[FreeTextEntry2] : weekly couples sessions\par  \par  Problem: varying parenting styles\par  Goal: effective communication and exploration/awareness of useful parenting skills\par  \par  will use CBT as well as skill building and supporting psychoeducation [Emotionally Focused Couples Therapy] : Emotionally Focused Couples Therapy [Psychoeducation] : Psychoeducation  [Skills training (all types)] : Skills training (all types)  [Supportive Therapy] : Supportive Therapy [de-identified] : The above named patient and her  present for their weekly session, they're alert and oriented.  Patient seems to have a high level of stress on this day as she reviews her emotional reaction to her  during recent event. She's been unable to express these emotions and has decided to do so during this session. Also explored why patient may have not felt confident or secure in expressing her feelings outside of this session. This writer continues to review the importance of communicating and doing so in a timely manner. Reviewed  the importance of understanding varying approaches and perceptions within a relationship and how this awareness may increase  feeling of connectedness and insight into each other's needs. This writer reviewed the importance of understanding varying  parenting  and how doing so will increase levels of respect in approaching situations in a team effort . Validated the high level of stress over the weekend when couple traveled with their toddler for the first time through an airport. Reviewed how these stressors may have impacted their interactions. No other needs or concerns noted at this time. Continue to review couples' cycle which continues to be evident. Reviewed issues concerning feeling judged versus unheard, and encouraged couple to place further focus on these reactions. No other needs or concerns noted at this time. Have arranged to meet with patient and her  again at 12:00 on the 13th.  [FreeTextEntry1] : weekly sessions with couple to address communication differences, coping styles, parenting styles  [Every ___ week(s)] : Psychotherapy: Every [unfilled] week(s) [Family/Marital/Collateral Therapy] : Family/Marital/Collateral Therapy [FreeTextEntry4] : weekly sessions using CBT, skill building, psychoeducation , EFT\par  \par

## 2023-09-13 ENCOUNTER — APPOINTMENT (OUTPATIENT)
Dept: PSYCHIATRY | Facility: CLINIC | Age: 33
End: 2023-09-13

## 2023-10-23 ENCOUNTER — NON-APPOINTMENT (OUTPATIENT)
Age: 33
End: 2023-10-23

## 2023-11-16 ENCOUNTER — APPOINTMENT (OUTPATIENT)
Dept: PSYCHIATRY | Facility: CLINIC | Age: 33
End: 2023-11-16
Payer: COMMERCIAL

## 2023-11-16 PROCEDURE — 90847 FAMILY PSYTX W/PT 50 MIN: CPT | Mod: GT

## 2023-11-22 ENCOUNTER — TRANSCRIPTION ENCOUNTER (OUTPATIENT)
Age: 33
End: 2023-11-22